# Patient Record
Sex: MALE | Race: WHITE
[De-identification: names, ages, dates, MRNs, and addresses within clinical notes are randomized per-mention and may not be internally consistent; named-entity substitution may affect disease eponyms.]

---

## 2019-04-30 PROBLEM — Z00.00 ENCOUNTER FOR PREVENTIVE HEALTH EXAMINATION: Status: ACTIVE | Noted: 2019-04-30

## 2019-05-02 ENCOUNTER — APPOINTMENT (OUTPATIENT)
Dept: OTOLARYNGOLOGY | Facility: CLINIC | Age: 74
End: 2019-05-02
Payer: MEDICARE

## 2019-05-02 ENCOUNTER — APPOINTMENT (OUTPATIENT)
Age: 74
End: 2019-05-02
Payer: MEDICARE

## 2019-05-02 DIAGNOSIS — Z78.9 OTHER SPECIFIED HEALTH STATUS: ICD-10-CM

## 2019-05-02 DIAGNOSIS — Z82.49 FAMILY HISTORY OF ISCHEMIC HEART DISEASE AND OTHER DISEASES OF THE CIRCULATORY SYSTEM: ICD-10-CM

## 2019-05-02 DIAGNOSIS — Z92.89 PERSONAL HISTORY OF OTHER MEDICAL TREATMENT: ICD-10-CM

## 2019-05-02 DIAGNOSIS — H90.3 SENSORINEURAL HEARING LOSS, BILATERAL: ICD-10-CM

## 2019-05-02 DIAGNOSIS — Z86.79 PERSONAL HISTORY OF OTHER DISEASES OF THE CIRCULATORY SYSTEM: ICD-10-CM

## 2019-05-02 DIAGNOSIS — Z87.891 PERSONAL HISTORY OF NICOTINE DEPENDENCE: ICD-10-CM

## 2019-05-02 DIAGNOSIS — Z87.09 PERSONAL HISTORY OF OTHER DISEASES OF THE RESPIRATORY SYSTEM: ICD-10-CM

## 2019-05-02 PROCEDURE — 92567 TYMPANOMETRY: CPT

## 2019-05-02 PROCEDURE — 92557 COMPREHENSIVE HEARING TEST: CPT

## 2019-05-02 PROCEDURE — 99203 OFFICE O/P NEW LOW 30 MIN: CPT | Mod: 25

## 2019-05-02 PROCEDURE — 31575 DIAGNOSTIC LARYNGOSCOPY: CPT

## 2019-05-02 PROCEDURE — 69210 REMOVE IMPACTED EAR WAX UNI: CPT

## 2019-05-02 RX ORDER — FLUTICASONE FUROATE AND VILANTEROL TRIFENATATE 200; 25 UG/1; UG/1
200-25 POWDER RESPIRATORY (INHALATION)
Qty: 180 | Refills: 0 | Status: ACTIVE | COMMUNITY
Start: 2018-08-08

## 2019-05-02 RX ORDER — ASPIRIN 81 MG
81 TABLET, DELAYED RELEASE (ENTERIC COATED) ORAL
Refills: 0 | Status: ACTIVE | COMMUNITY

## 2019-05-02 RX ORDER — IPRATROPIUM BROMIDE 21 UG/1
0.03 SPRAY NASAL
Qty: 1 | Refills: 3 | Status: ACTIVE | COMMUNITY
Start: 2019-05-02 | End: 1900-01-01

## 2019-05-02 RX ORDER — TAMSULOSIN HYDROCHLORIDE 0.4 MG/1
0.4 CAPSULE ORAL
Qty: 180 | Refills: 0 | Status: ACTIVE | COMMUNITY
Start: 2018-12-04

## 2019-05-02 RX ORDER — ZOLPIDEM TARTRATE 10 MG/1
10 TABLET ORAL
Qty: 30 | Refills: 0 | Status: COMPLETED | COMMUNITY
Start: 2018-08-22

## 2019-05-02 RX ORDER — PANTOPRAZOLE 40 MG/1
40 TABLET, DELAYED RELEASE ORAL
Qty: 90 | Refills: 0 | Status: ACTIVE | COMMUNITY
Start: 2019-01-24

## 2019-05-02 RX ORDER — FUROSEMIDE 20 MG/1
20 TABLET ORAL
Qty: 25 | Refills: 0 | Status: ACTIVE | COMMUNITY
Start: 2018-12-13

## 2019-05-02 RX ORDER — ZOLPIDEM TARTRATE 10 MG/1
10 TABLET ORAL
Refills: 0 | Status: ACTIVE | COMMUNITY

## 2019-05-02 RX ORDER — METOPROLOL SUCCINATE 25 MG/1
25 TABLET, EXTENDED RELEASE ORAL
Qty: 270 | Refills: 0 | Status: COMPLETED | COMMUNITY
Start: 2018-02-23

## 2019-05-02 RX ORDER — ROSUVASTATIN CALCIUM 5 MG/1
TABLET, FILM COATED ORAL
Refills: 0 | Status: ACTIVE | COMMUNITY

## 2019-05-02 RX ORDER — LORAZEPAM 0.5 MG/1
0.5 TABLET ORAL
Qty: 90 | Refills: 0 | Status: COMPLETED | COMMUNITY
Start: 2018-12-04

## 2019-05-02 RX ORDER — METOPROLOL SUCCINATE 25 MG/1
25 TABLET, EXTENDED RELEASE ORAL
Qty: 270 | Refills: 0 | Status: ACTIVE | COMMUNITY
Start: 2018-02-23

## 2019-05-02 RX ORDER — LORAZEPAM 0.5 MG/1
0.5 TABLET ORAL
Qty: 90 | Refills: 0 | Status: ACTIVE | COMMUNITY
Start: 2018-12-04

## 2019-05-02 RX ORDER — LISINOPRIL 40 MG/1
40 TABLET ORAL
Qty: 90 | Refills: 0 | Status: COMPLETED | COMMUNITY
Start: 2018-06-25

## 2019-05-02 RX ORDER — FLUOROURACIL 50 MG/G
5 CREAM TOPICAL
Qty: 40 | Refills: 0 | Status: ACTIVE | COMMUNITY
Start: 2018-12-13

## 2019-05-02 RX ORDER — ARIPIPRAZOLE 2 MG/1
2 TABLET ORAL
Qty: 90 | Refills: 0 | Status: COMPLETED | COMMUNITY
Start: 2018-12-13

## 2019-05-02 RX ORDER — ROSUVASTATIN CALCIUM 10 MG/1
10 TABLET, FILM COATED ORAL
Qty: 90 | Refills: 0 | Status: ACTIVE | COMMUNITY
Start: 2019-01-23

## 2019-05-02 RX ORDER — OMEGA-3/DHA/EPA/FISH OIL 300-1000MG
CAPSULE ORAL
Refills: 0 | Status: ACTIVE | COMMUNITY

## 2019-05-02 RX ORDER — ARIPIPRAZOLE 2 MG/1
2 TABLET ORAL
Qty: 90 | Refills: 0 | Status: ACTIVE | COMMUNITY
Start: 2018-12-13

## 2019-05-02 RX ORDER — BUPROPION HYDROCHLORIDE 150 MG/1
150 TABLET, EXTENDED RELEASE ORAL
Refills: 0 | Status: ACTIVE | COMMUNITY

## 2019-05-02 RX ORDER — MIRTAZAPINE 30 MG/1
30 TABLET, FILM COATED ORAL
Qty: 90 | Refills: 0 | Status: ACTIVE | COMMUNITY
Start: 2017-12-15

## 2019-05-02 NOTE — ASSESSMENT
[FreeTextEntry1] : He has a 4-5 day history of hoarseness. He was not to start. He does have findings consistent with Candida in the oropharynx. The vocal cords were mobile. There were no suspicious masses or lesions. 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 Audiogram shows bilateral SNHL\par \par Plan\par -Findings and management options were discussed with the patient.\par - Hydration and humidification \par - He should rest his voice. avoid yelling, whispering, singing, coughing or throat clearing\par - Saltwater gargles prn. he should rinse his mouth well after using the inhaler\par - Nasal saline irrigations\par - trial of atrovent nasal spray. he has used flonase. \par - nystatin oral rinses. consider diflucan if not improving. \par - good aural hygiene reviewed\par - avoid using cotton swabs in the ears\par - wax removal drops prn\par - annual audiogram\par - HAE recommended. \par -follow up in 1-2 weeks. return earlier if any problems.

## 2019-05-02 NOTE — CONSULT LETTER
[Dear  ___] : Dear  [unfilled], [Consult Letter:] : I had the pleasure of evaluating your patient, [unfilled]. [Please see my note below.] : Please see my note below. [Consult Closing:] : Thank you very much for allowing me to participate in the care of this patient.  If you have any questions, please do not hesitate to contact me. [Sincerely,] : Sincerely, [FreeTextEntry3] : .,sig\par

## 2019-05-02 NOTE — HISTORY OF PRESENT ILLNESS
[de-identified] : BHANU WILKINS is a 74 year patient Here for a 4-5 day history of hoarseness. He also has pressure in his glands/throat. He was not sick when this started. He denies throat pain, vocal abuse, dysphagia, cough, or bleeding. He is on a PPI. He also uses inhalers. He also has a long history of chronic vasomotor rhinitis. He uses Flonase. He also may have hearing loss.

## 2019-05-09 ENCOUNTER — APPOINTMENT (OUTPATIENT)
Dept: OTOLARYNGOLOGY | Facility: CLINIC | Age: 74
End: 2019-05-09
Payer: MEDICARE

## 2019-05-09 VITALS
HEIGHT: 69 IN | SYSTOLIC BLOOD PRESSURE: 128 MMHG | WEIGHT: 150 LBS | BODY MASS INDEX: 22.22 KG/M2 | DIASTOLIC BLOOD PRESSURE: 68 MMHG | HEART RATE: 79 BPM

## 2019-05-09 DIAGNOSIS — J30.0 VASOMOTOR RHINITIS: ICD-10-CM

## 2019-05-09 PROCEDURE — 31575 DIAGNOSTIC LARYNGOSCOPY: CPT

## 2019-05-09 PROCEDURE — 99213 OFFICE O/P EST LOW 20 MIN: CPT | Mod: 25

## 2019-05-09 RX ORDER — MONTELUKAST 10 MG/1
10 TABLET, FILM COATED ORAL
Qty: 90 | Refills: 0 | Status: DISCONTINUED | COMMUNITY
Start: 2019-01-24 | End: 2019-05-09

## 2019-05-09 RX ORDER — METHYLPREDNISOLONE 4 MG/1
4 TABLET ORAL
Qty: 21 | Refills: 0 | Status: DISCONTINUED | COMMUNITY
Start: 2018-11-08 | End: 2019-05-09

## 2019-05-09 RX ORDER — CIPROFLOXACIN HYDROCHLORIDE 500 MG/1
500 TABLET, FILM COATED ORAL
Qty: 14 | Refills: 0 | Status: DISCONTINUED | COMMUNITY
Start: 2018-12-23 | End: 2019-05-09

## 2019-05-09 RX ORDER — ACETAMINOPHEN AND CODEINE 300; 30 MG/1; MG/1
300-30 TABLET ORAL
Qty: 30 | Refills: 0 | Status: COMPLETED | COMMUNITY
Start: 2018-12-27 | End: 2019-05-09

## 2019-05-09 RX ORDER — MIRTAZAPINE 30 MG/1
30 TABLET, FILM COATED ORAL
Qty: 90 | Refills: 0 | Status: COMPLETED | COMMUNITY
Start: 2017-12-15 | End: 2019-05-09

## 2019-05-09 RX ORDER — NYSTATIN 100000 [USP'U]/ML
100000 SUSPENSION ORAL 3 TIMES DAILY
Qty: 105 | Refills: 0 | Status: COMPLETED | COMMUNITY
Start: 2019-05-02 | End: 2019-05-09

## 2019-05-09 NOTE — ASSESSMENT
[FreeTextEntry1] : He has persistent hoarseness which started about 2 weeks ago. He had evidence of Candida in the oropharynx on exam. He was treated with nystatin  but it did not help. The candida appears to have resolved. There were no suspicious laryngeal masses or lesions. He did have irritation of the vocal cords and reflux related laryngeal changes. The left vocal cord was paretic at his last exam but movement was more symmetrical today. Culture was sent for bacterial and fungal infection\par \par Plan\par -Findings and management options were discussed with the patient.\par - Hydration and humidification \par - I again asked him to rest his voice. avoid yelling, whispering, singing, coughing or throat clearing\par - I again recommended that he rinse his mouth well after using the inhaler\par - continue nystatin oral rinses for another week. \par - continue the PPI\par - I am placing him on a short burst of prednisone. He has taken steroids in the past without complication\par - he was asked to call for the culture results\par - consider speech therapy if the hoarseness is not improving. \par -follow up in 2 weeks. return earlier if any problems.

## 2019-05-09 NOTE — HISTORY OF PRESENT ILLNESS
[FreeTextEntry1] : \par 5/9/19- Augustus Hidalgo Is here for follow up. He has had hoarseness for the past 2-3 weeks. He had evidence of Candida in the oropharynx on exam. He reflux related laryngeal changes on flexible laryngoscopy without any suspicious masses or lesions. He used nystatin for the past week. The hoarseness hasn't changed. He uses Breo inhaler. I asked him to gargle with water after using the inhaler but he has not yet done that. He is on a PPI for reflux. He has no dysphagia or throat pain.  [de-identified] : 5/2/19- BHANU WILKINS is a 74 year patient Here for a 4-5 day history of hoarseness. He also has pressure in his glands/throat. He was not sick when this started. He denies throat pain, vocal abuse, dysphagia, cough, or bleeding. He is on a PPI. He also uses inhalers. He also has a long history of chronic vasomotor rhinitis. He uses Flonase. He also may have hearing loss.

## 2019-05-09 NOTE — CONSULT LETTER
[Consult Letter:] : I had the pleasure of evaluating your patient, [unfilled]. [Dear  ___] : Dear  [unfilled], [Please see my note below.] : Please see my note below. [Sincerely,] : Sincerely, [Consult Closing:] : Thank you very much for allowing me to participate in the care of this patient.  If you have any questions, please do not hesitate to contact me. [FreeTextEntry3] : .,sig\par

## 2019-05-13 LAB
BACTERIA THROAT CULT: NORMAL
FUNGUS SPEC CULT ORG #8: ABNORMAL

## 2019-05-14 ENCOUNTER — APPOINTMENT (OUTPATIENT)
Dept: OTOLARYNGOLOGY | Facility: CLINIC | Age: 74
End: 2019-05-14

## 2019-05-31 ENCOUNTER — APPOINTMENT (OUTPATIENT)
Dept: OTOLARYNGOLOGY | Facility: CLINIC | Age: 74
End: 2019-05-31
Payer: MEDICARE

## 2019-05-31 DIAGNOSIS — H61.23 IMPACTED CERUMEN, BILATERAL: ICD-10-CM

## 2019-05-31 PROCEDURE — 31575 DIAGNOSTIC LARYNGOSCOPY: CPT

## 2019-05-31 PROCEDURE — 99213 OFFICE O/P EST LOW 20 MIN: CPT | Mod: 25

## 2019-05-31 RX ORDER — PREDNISONE 10 MG/1
10 TABLET ORAL
Qty: 12 | Refills: 0 | Status: COMPLETED | COMMUNITY
Start: 2019-05-09 | End: 2019-05-31

## 2019-05-31 RX ORDER — NYSTATIN 100000 [USP'U]/ML
100000 SUSPENSION ORAL 3 TIMES DAILY
Qty: 105 | Refills: 0 | Status: COMPLETED | COMMUNITY
Start: 2019-05-08 | End: 2019-05-31

## 2019-05-31 NOTE — CONSULT LETTER
[Dear  ___] : Dear  [unfilled], [Courtesy Letter:] : I had the pleasure of seeing your patient, [unfilled], in my office today. [Please see my note below.] : Please see my note below. [Consult Closing:] : Thank you very much for allowing me to participate in the care of this patient.  If you have any questions, please do not hesitate to contact me. [Sincerely,] : Sincerely, [FreeTextEntry3] : .,sig\par

## 2019-05-31 NOTE — ASSESSMENT
[FreeTextEntry1] : He has persistent hoarseness. He has been on nystatin oral rinses for Candida, oral steroids, and a PPI. He has not had improvement.  The Candida has resolved. There were no suspicious masses or lesions on flexible laryngoscopy. Flexible laryngoscopy shows mildly edematous vocal cords and reflux related laryngeal changes. He also has muscle tension dysphonia.\par \par Plan\par -Findings and management options were discussed with the patient.\par - Hydration and humidification \par - good vocal hygiene\par - he should continue to rinse his mouth well after using the inhaler\par - continue the PPI\par - speech therapy recommended\par - i am also recommended evaluation at the voice center by a specialized laryngologist for videostroboscopy. We will see if further work up such as imaging studies are needed. \par -follow up after the speech and laryngology evaluation. He should call and return earlier if any problems or worsening symptoms.

## 2021-06-22 ENCOUNTER — APPOINTMENT (OUTPATIENT)
Dept: OTOLARYNGOLOGY | Facility: CLINIC | Age: 76
End: 2021-06-22
Payer: MEDICARE

## 2021-06-22 VITALS — WEIGHT: 162 LBS | TEMPERATURE: 96.5 F | HEIGHT: 69 IN | BODY MASS INDEX: 23.99 KG/M2

## 2021-06-22 DIAGNOSIS — Z86.19 PERSONAL HISTORY OF OTHER INFECTIOUS AND PARASITIC DISEASES: ICD-10-CM

## 2021-06-22 DIAGNOSIS — K21.9 GASTRO-ESOPHAGEAL REFLUX DISEASE W/OUT ESOPHAGITIS: ICD-10-CM

## 2021-06-22 DIAGNOSIS — H90.3 SENSORINEURAL HEARING LOSS, BILATERAL: ICD-10-CM

## 2021-06-22 PROCEDURE — 31575 DIAGNOSTIC LARYNGOSCOPY: CPT

## 2021-06-22 PROCEDURE — 99213 OFFICE O/P EST LOW 20 MIN: CPT | Mod: 25

## 2021-06-22 RX ORDER — CLOPIDOGREL 75 MG/1
TABLET, FILM COATED ORAL
Refills: 0 | Status: ACTIVE | COMMUNITY

## 2021-06-22 RX ORDER — SERTRALINE 25 MG/1
TABLET, FILM COATED ORAL
Refills: 0 | Status: ACTIVE | COMMUNITY

## 2021-06-22 RX ORDER — UMECLIDINIUM 62.5 UG/1
AEROSOL, POWDER ORAL
Refills: 0 | Status: ACTIVE | COMMUNITY

## 2021-06-22 RX ORDER — COLCHICINE 0.6 MG/1
CAPSULE ORAL
Refills: 0 | Status: ACTIVE | COMMUNITY

## 2021-06-22 RX ORDER — APIXABAN 5 MG/1
TABLET, FILM COATED ORAL
Refills: 0 | Status: ACTIVE | COMMUNITY

## 2021-06-22 RX ORDER — LISINOPRIL 40 MG/1
40 TABLET ORAL
Qty: 90 | Refills: 0 | Status: DISCONTINUED | COMMUNITY
Start: 2018-06-25 | End: 2021-06-22

## 2021-06-22 NOTE — ASSESSMENT
[FreeTextEntry1] : He continues to suffer from hoarseness. On flexible laryngoscopy, there were no obvious masses or lesions. 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 He also suffers from reflux. He has a large septal perforation with crusting in the nasal cavity on both sides\par \par He has bilateral sensorineural hearing loss \par \par Plan\par -Findings and management options were discussed with the patient.\par - Hydration and humidification \par - good vocal hygiene\par - he should continue to rinse his mouth well after using the inhaler\par - continue treatment for reflux \par - I recommended evaluation at the voice center with videostroboscopy by a specialized laryngologist, Dr. Gallego. He may benefit from speech therapy. We will also see if there are other treatment options.\par - Nasal saline irrigations and moisturizing nasal gel\par -Consider repeat audiogram and hearing aid evaluation\par - Follow up or call after the evaluation but Dr. Gallego. Call and return urgently if any worsening symptoms

## 2021-07-16 ENCOUNTER — APPOINTMENT (OUTPATIENT)
Dept: OTOLARYNGOLOGY | Facility: CLINIC | Age: 76
End: 2021-07-16
Payer: MEDICARE

## 2021-07-16 VITALS
SYSTOLIC BLOOD PRESSURE: 145 MMHG | TEMPERATURE: 97.3 F | HEART RATE: 60 BPM | DIASTOLIC BLOOD PRESSURE: 80 MMHG | BODY MASS INDEX: 23.99 KG/M2 | OXYGEN SATURATION: 98 % | HEIGHT: 69 IN | WEIGHT: 162 LBS

## 2021-07-16 DIAGNOSIS — H61.23 IMPACTED CERUMEN, BILATERAL: ICD-10-CM

## 2021-07-16 PROCEDURE — 69210 REMOVE IMPACTED EAR WAX UNI: CPT

## 2021-07-16 PROCEDURE — 31579 LARYNGOSCOPY TELESCOPIC: CPT

## 2021-07-16 PROCEDURE — 99214 OFFICE O/P EST MOD 30 MIN: CPT | Mod: 25

## 2021-07-16 NOTE — PROCEDURE
[de-identified] : Flexible Laryngoscopy with Stroboscopy \par Procedure Note\par  \par Pre-operative Diagnosis: dysphonia\par Post-operative Diagnosis: posterior cricoid edema, vocal fold atrophy with supraglottic compression\par Anesthesia: Topical - 1 % Lidocaine/Phenylephrine\par Procedure: Flexible Laryngoscopy with Stroboscopy\par Procedure Details: \par The patient was placed in the sitting position. After decongestant and anesthesia were applied the laryngoscope was passed. The nasal cavities, nasopharynx, oropharynx, hypopharynx, and larynx were all examined. Vocal folds were examined during respiration and phonation. The following findings were noted:\par  \par Findings: \par Nose: Septum is midline, turbinates are normal, nasal airways patent, mucosa normal\par Nasopharynx: Adenoids normal, no masses, eustachian tube normal\par Oropharynx: Pharyngeal walls symmetric and without lesion. Tonsils/fossae symmetric\par Hypopharynx: Hypopharynx and pyriform sinuses without lesion. No masses or asymmetry. No pooling of secretions.\par Larynx: Epiglottis and aryepiglottic folds were sharp and crisp bilaterally. Bilateral false vocal folds normal appearance. Airway was widely patent.\par  \par Strobe Exam Ratings\par TVF Appearance: bilateral vocal fold atrophy\par TVF Mobility: normal\par Edema/hypertrophy: none\par Mucus on TVF: scant\par Glottic Closure: anterior gapping\par Mucosal Wave: normal\par Amplitude of Vibration: normal\par Phase: symmetric\par Supraglottic Hyperfunction: + supraglottic compression\par Other Findings: none\par  \par Condition: Stable. Patient tolerated procedure well.\par  \par Complications: None\par \par

## 2021-07-16 NOTE — HISTORY OF PRESENT ILLNESS
Patient called back. Please call before 3:00pm.    [de-identified] : 76M who presents with dysphonia x 1 year. Patient reports he has had progressive dysphonia that some days manifests with little to no voice and other days with a "muffled" voice. He feels he has to strain to increase the volume of his voice. He does admit to occasional SOB that he attributes to his COPD. He denies any sore throat, odynophagia, dysphagia of solids or liquids. He denies any other ENT complaints.  He has few voice demands other than talking to people on the phone where he says people can't hear him.  He was referred by Dr. Moore who in the past has treated him for a candida infection, and recommended speech pathology.\par \par In terms diet:\par + coffee (1 cup daily), citrus, tomatoes, chocolate, ETOH (wine), spicy food\par - tea, soda, mint, fried food\par Drinks 2 glasses of water daily. calm

## 2021-07-16 NOTE — PHYSICAL EXAM
[Normal] : mucosa is normal [Midline] : trachea located in midline position [FreeTextEntry1] : Voice is hoarse, raspy, breathy, with decreased projection, normal vocal range, poor pitch control [de-identified] : bilateral EAC with cerumen, removed [de-identified] : noted septal perforation

## 2021-07-16 NOTE — ASSESSMENT
[FreeTextEntry1] : 76M who presents with dysphonia x 1 year. On exam, patient has raspy, breathy voice and on laryngoscopy, there is evidence of vocal fold atrophy with supraglottic compression resulting in muscle tension dysphonia. There was also noted posterior cricoid edema. At this point I am recommending evaluation and treatment with Speech Pathology.  If still without improvement we breifly discussed vocal fold injection augmentation.  Pt to follow up in 6-8 weeks for re-evaluation.\par \par *of notes, ears cleaned bilaterally without issue.\par \par Plan:\par - voice hygiene\par - voice therapy\par - Low acid diet modifications- LPR sheet provided\par - c/s speech pathology

## 2021-07-21 ENCOUNTER — APPOINTMENT (OUTPATIENT)
Dept: OTOLARYNGOLOGY | Facility: CLINIC | Age: 76
End: 2021-07-21
Payer: MEDICARE

## 2021-07-21 PROCEDURE — 31579 LARYNGOSCOPY TELESCOPIC: CPT

## 2021-07-21 PROCEDURE — 92524 BEHAVRAL QUALIT ANALYS VOICE: CPT | Mod: GN

## 2021-07-28 ENCOUNTER — APPOINTMENT (OUTPATIENT)
Dept: OTOLARYNGOLOGY | Facility: CLINIC | Age: 76
End: 2021-07-28

## 2021-08-11 ENCOUNTER — APPOINTMENT (OUTPATIENT)
Dept: OTOLARYNGOLOGY | Facility: CLINIC | Age: 76
End: 2021-08-11

## 2021-08-13 ENCOUNTER — APPOINTMENT (OUTPATIENT)
Dept: OTOLARYNGOLOGY | Facility: CLINIC | Age: 76
End: 2021-08-13
Payer: MEDICARE

## 2021-08-13 DIAGNOSIS — B37.89 OTHER SITES OF CANDIDIASIS: ICD-10-CM

## 2021-08-13 DIAGNOSIS — R49.0 DYSPHONIA: ICD-10-CM

## 2021-08-13 DIAGNOSIS — J38.7 OTHER DISEASES OF LARYNX: ICD-10-CM

## 2021-08-13 PROCEDURE — 99212 OFFICE O/P EST SF 10 MIN: CPT | Mod: 95

## 2021-08-13 RX ORDER — ITRACONAZOLE 100 MG/1
100 CAPSULE, COATED PELLETS ORAL
Qty: 42 | Refills: 0 | Status: ACTIVE | COMMUNITY
Start: 2021-08-13 | End: 1900-01-01

## 2021-08-14 PROBLEM — J38.7 PRESBYLARYNGES: Status: ACTIVE | Noted: 2021-07-16

## 2021-08-14 PROBLEM — B37.89 CANDIDA LARYNGITIS: Status: ACTIVE | Noted: 2021-08-14

## 2021-08-14 PROBLEM — R49.0 HOARSENESS: Status: ACTIVE | Noted: 2019-05-02

## 2021-08-14 PROBLEM — R49.0 DYSPHONIA: Status: ACTIVE | Noted: 2021-07-16

## 2021-08-14 NOTE — HISTORY OF PRESENT ILLNESS
[Home] : at home, [unfilled] , at the time of the visit. [Medical Office: ([facility])___] : at the medical office located in  [Verbal consent obtained from patient] : the patient, [unfilled] [de-identified] : 76M who presents with dysphonia x 1 year. Patient reports he has had progressive dysphonia that some days manifests with little to no voice and other days with a "muffled" voice. He feels he has to strain to increase the volume of his voice. He does admit to occasional SOB that he attributes to his COPD. He denies any sore throat, odynophagia, dysphagia of solids or liquids. He denies any other ENT complaints.  He has few voice demands other than talking to people on the phone where he says people can't hear him.  He was referred by Dr. Moore who in the past has treated him for a candida infection, and recommended speech pathology.\par \par In terms diet:\par + coffee (1 cup daily), citrus, tomatoes, chocolate, ETOH (wine), spicy food\par - tea, soda, mint, fried food\par Drinks 2 glasses of water daily.\par - [FreeTextEntry1] : Update 8/13/21\par Pt is stable and doing well.  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  On exam there was concern for recurrence of fungal laryngitis.  Those images were reviewed.

## 2021-08-14 NOTE — ASSESSMENT
[FreeTextEntry1] : 76M who presents with dysphonia x 1 year. On exam, patient has raspy, breathy voice and on laryngoscopy, there is evidence of vocal fold atrophy with supraglottic compression resulting in muscle tension dysphonia. There was also noted posterior cricoid edema. At this point I am recommending evaluation and treatment with Speech Pathology.\par \par When working with speech pathology, it was noted that there was continued laryngitis.  We discussed treatment for candida laryngitis including 3 weeks of itraconazole.  He will check with cardiologist as there is some issues with taking with eliquis.  \par \par Pt will follow up with speech pathology and then again with me in 6 weeks.  If still without improvement we briefly discussed vocal fold injection augmentation.  Pt to follow up in 6-8 weeks for re-evaluation.\par \par \par Plan:\par - itraconazole 100mg, bid, x 21 days\par - voice hygiene\par - voice therapy\par - Low acid diet modifications- LPR sheet provided\par - cont with speech pathology

## 2021-08-25 ENCOUNTER — APPOINTMENT (OUTPATIENT)
Dept: OTOLARYNGOLOGY | Facility: CLINIC | Age: 76
End: 2021-08-25

## 2021-09-08 ENCOUNTER — APPOINTMENT (OUTPATIENT)
Dept: OTOLARYNGOLOGY | Facility: CLINIC | Age: 76
End: 2021-09-08

## 2021-09-22 ENCOUNTER — APPOINTMENT (OUTPATIENT)
Dept: OTOLARYNGOLOGY | Facility: CLINIC | Age: 76
End: 2021-09-22

## 2025-05-30 NOTE — HISTORY OF PRESENT ILLNESS
[de-identified] : 5/2/19- BHANU WILKINS is a 74 year patient Here for a 4-5 day history of hoarseness. He also has pressure in his glands/throat. He was not sick when this started. He denies throat pain, vocal abuse, dysphagia, cough, or bleeding. He is on a PPI. He also uses inhalers. He also has a long history of chronic vasomotor rhinitis. He uses Flonase. He also may have hearing loss. Treatment Goal Explanation (Does Not Render In The Note): Stable for the purposes of categorizing medical decision making is defined by the specific treatment goals for an individual patient. A patient that is not at their treatment goal is not stable, even if the condition has not changed and there is no short- term threat to life or function. [FreeTextEntry1] : \par 5/9/19- Augustus Hidalgo Is here for follow up. He has had hoarseness for the past 2-3 weeks. He had evidence of Candida in the oropharynx on exam. He reflux related laryngeal changes on flexible laryngoscopy without any suspicious masses or lesions. He used nystatin for the past week. The hoarseness hasn't changed. He uses Breo inhaler. I asked him to gargle with water after using the inhaler but he has not yet done that. He is on a PPI for reflux. He has no dysphagia or throat pain.  \par \par 5/31/19- Augustus Hidalgo Is here for followup for hoarseness. The hoarseness is unchanged. He took a course of steroids. He did not notice improvement. Cultures taken at his last visit showed few Candida. He had been on nystatin oral rinses. He does rinses his mouth now after using his inhaler. He is also on a PPI. He has no throat pain or dysphagia